# Patient Record
Sex: MALE | Race: WHITE | NOT HISPANIC OR LATINO | Employment: PART TIME
[De-identification: names, ages, dates, MRNs, and addresses within clinical notes are randomized per-mention and may not be internally consistent; named-entity substitution may affect disease eponyms.]

---

## 2024-06-12 ENCOUNTER — DOCUMENTATION (OUTPATIENT)
Dept: DATA CONVERSION | Age: 49
End: 2024-06-12
Payer: COMMERCIAL

## 2024-06-28 ENCOUNTER — LAB (OUTPATIENT)
Dept: LAB | Facility: LAB | Age: 49
End: 2024-06-28
Payer: COMMERCIAL

## 2024-06-28 ENCOUNTER — OFFICE VISIT (OUTPATIENT)
Dept: PAIN MEDICINE | Facility: HOSPITAL | Age: 49
End: 2024-06-28
Payer: COMMERCIAL

## 2024-06-28 VITALS — BODY MASS INDEX: 20.32 KG/M2 | HEIGHT: 72 IN | WEIGHT: 150 LBS

## 2024-06-28 DIAGNOSIS — M51.37 DEGENERATION OF LUMBAR OR LUMBOSACRAL INTERVERTEBRAL DISC: ICD-10-CM

## 2024-06-28 DIAGNOSIS — Z79.899 ENCOUNTER FOR LONG-TERM (CURRENT) DRUG USE: ICD-10-CM

## 2024-06-28 DIAGNOSIS — Z98.890 S/P BILATERAL INGUINAL HERNIORRHAPHY: ICD-10-CM

## 2024-06-28 DIAGNOSIS — R10.2 PELVIC PAIN: Primary | Chronic | ICD-10-CM

## 2024-06-28 DIAGNOSIS — Z87.19 S/P BILATERAL INGUINAL HERNIORRHAPHY: ICD-10-CM

## 2024-06-28 LAB
AMPHETAMINES UR QL SCN: ABNORMAL
BARBITURATES UR QL SCN: ABNORMAL
BENZODIAZ UR QL SCN: ABNORMAL
BZE UR QL SCN: ABNORMAL
CANNABINOIDS UR QL SCN: ABNORMAL
FENTANYL+NORFENTANYL UR QL SCN: ABNORMAL
METHADONE UR QL SCN: ABNORMAL
OPIATES UR QL SCN: ABNORMAL
OXYCODONE+OXYMORPHONE UR QL SCN: ABNORMAL
PCP UR QL SCN: ABNORMAL

## 2024-06-28 PROCEDURE — 80307 DRUG TEST PRSMV CHEM ANLYZR: CPT

## 2024-06-28 PROCEDURE — 99203 OFFICE O/P NEW LOW 30 MIN: CPT | Performed by: PAIN MEDICINE

## 2024-06-28 PROCEDURE — 1036F TOBACCO NON-USER: CPT | Performed by: PAIN MEDICINE

## 2024-06-28 PROCEDURE — 80361 OPIATES 1 OR MORE: CPT

## 2024-06-28 PROCEDURE — 80346 BENZODIAZEPINES1-12: CPT

## 2024-06-28 PROCEDURE — 80349 CANNABINOIDS NATURAL: CPT

## 2024-06-28 PROCEDURE — 99213 OFFICE O/P EST LOW 20 MIN: CPT | Performed by: PAIN MEDICINE

## 2024-06-28 PROCEDURE — 80365 DRUG SCREENING OXYCODONE: CPT

## 2024-06-28 RX ORDER — HYDROCODONE BITARTRATE AND ACETAMINOPHEN 5; 325 MG/1; MG/1
1 TABLET ORAL 3 TIMES DAILY PRN
COMMUNITY

## 2024-06-28 RX ORDER — DICYCLOMINE HYDROCHLORIDE 10 MG/1
10 CAPSULE ORAL 3 TIMES DAILY PRN
COMMUNITY

## 2024-06-28 RX ORDER — MELOXICAM 7.5 MG/1
7.5 TABLET ORAL DAILY PRN
COMMUNITY

## 2024-06-28 RX ORDER — ALPRAZOLAM 1 MG/1
1 TABLET ORAL 3 TIMES DAILY PRN
COMMUNITY

## 2024-06-28 RX ORDER — DIPHENOXYLATE HYDROCHLORIDE AND ATROPINE SULFATE 2.5; .025 MG/5ML; MG/5ML
5 SOLUTION ORAL 3 TIMES DAILY PRN
COMMUNITY

## 2024-06-28 RX ORDER — NALOXONE HYDROCHLORIDE 4 MG/.1ML
1 SPRAY NASAL AS NEEDED
Qty: 2 EACH | Refills: 0 | Status: SHIPPED | OUTPATIENT
Start: 2024-06-28

## 2024-06-28 RX ORDER — METHOCARBAMOL 750 MG/1
750 TABLET, FILM COATED ORAL NIGHTLY
COMMUNITY

## 2024-06-28 RX ORDER — HYDROCODONE BITARTRATE AND ACETAMINOPHEN 5; 325 MG/1; MG/1
1 TABLET ORAL EVERY 8 HOURS PRN
Qty: 90 TABLET | Refills: 0 | Status: SHIPPED | OUTPATIENT
Start: 2024-06-28 | End: 2024-07-28

## 2024-06-28 RX ORDER — ESTAZOLAM 2 MG/1
2 TABLET ORAL NIGHTLY
COMMUNITY

## 2024-06-28 ASSESSMENT — ENCOUNTER SYMPTOMS
ABDOMINAL PAIN: 1
BACK PAIN: 1
PSYCHIATRIC NEGATIVE: 1
RESPIRATORY NEGATIVE: 1
CONSTITUTIONAL NEGATIVE: 1
CARDIOVASCULAR NEGATIVE: 1
NEUROLOGICAL NEGATIVE: 1
HEMATOLOGIC/LYMPHATIC NEGATIVE: 1
EYES NEGATIVE: 1
ENDOCRINE NEGATIVE: 1
ALLERGIC/IMMUNOLOGIC NEGATIVE: 1

## 2024-06-28 ASSESSMENT — PAIN - FUNCTIONAL ASSESSMENT: PAIN_FUNCTIONAL_ASSESSMENT: 0-10

## 2024-06-28 ASSESSMENT — PAIN SCALES - GENERAL
PAINLEVEL_OUTOF10: 7
PAINLEVEL: 7

## 2024-06-28 NOTE — PROGRESS NOTES
Subjective   Anthony Faye is a 49 y.o. male with a history of anxiety, depression presents for evaluation of his low back, pelvic and hip pain.  Patient has long history of chronic low back pain, worse on the right with right-sided radicular symptoms in the L5-S1 distribution. It is attributed to his degenerative disc disease most prominent in the L5/S1 region.  He also has chronic history of pelvic pain which she reports started after his multiple hernia surgeries. It was exacerbated after last surgery in 2016.  He describes his pain as soreness, 7/10 intensity on most days.  He has previously been stable on hydrocodone 5-325 every 8 hours as needed.    Past medical history   -Anxiety     Past Surgical history:   -Lipoma removal   -Hernia repair x4     I have reviewed the nurses notes and am aware of family/social history.     Review of Systems   Constitutional: Negative.    HENT: Negative.     Eyes: Negative.    Respiratory: Negative.     Cardiovascular: Negative.    Gastrointestinal:  Positive for abdominal pain.   Endocrine: Negative.    Genitourinary: Negative.    Musculoskeletal:  Positive for back pain.   Skin: Negative.    Allergic/Immunologic: Negative.    Neurological: Negative.    Hematological: Negative.    Psychiatric/Behavioral: Negative.         Objective   Physical Exam  Eyes:      Extraocular Movements: Extraocular movements intact.      Pupils: Pupils are equal, round, and reactive to light.   Cardiovascular:      Rate and Rhythm: Normal rate and regular rhythm.   Pulmonary:      Effort: Pulmonary effort is normal.      Breath sounds: Normal breath sounds.   Abdominal:      General: Abdomen is flat.      Palpations: Abdomen is soft.   Musculoskeletal:         General: Normal range of motion.   Skin:     General: Skin is warm.   Neurological:      General: No focal deficit present.      Mental Status: He is alert and oriented to person, place, and time. Mental status is at baseline.      Comments:  Motor 5/5 bilateral upper lower extremity.  Sensory 5/5 bilateral upper lower extremity.     Provocative test:  Bilateral straight leg raise test negative.  Bilateral BHARAT test negative.    ASSESSMENT:     49-year-old male patient presenting for management of his chronic back, pelvic pain.  His back pain is due to degenerative disc disease that is most prominent at L5-S1.  It is worse on the right side with associated right-sided radiculopathy in the L5-S1 distribution.  His chronic pelvic pain is due to his pain after his multiple hernia repairs, that was exacerbated significantly after his last surgery in 2016.  He has been chronically treated with hydrocodone 5-325 mg every 8 hours.    PLAN:   -Continue hydrocodone 5-325 mg every 8 hours as needed    Discussed treatment plan with patient.   Call or return to clinic prn if these symptoms worsen or fail to improve as anticipated.     Radha Roberts MD

## 2024-07-01 DIAGNOSIS — M54.16 LUMBAR RADICULOPATHY: ICD-10-CM

## 2024-07-01 DIAGNOSIS — M96.1 POSTLAMINECTOMY SYNDROME OF LUMBAR REGION: Primary | ICD-10-CM

## 2024-07-02 LAB — CARBOXYTHC UR-MCNC: >500 NG/ML

## 2024-07-02 RX ORDER — METHOCARBAMOL 750 MG/1
750 TABLET, FILM COATED ORAL NIGHTLY
Qty: 30 TABLET | Refills: 3 | Status: SHIPPED | OUTPATIENT
Start: 2024-07-02 | End: 2024-10-30

## 2024-07-02 RX ORDER — MELOXICAM 7.5 MG/1
7.5 TABLET ORAL DAILY PRN
Qty: 30 TABLET | Refills: 0 | Status: SHIPPED | OUTPATIENT
Start: 2024-07-02 | End: 2024-08-01

## 2024-07-03 LAB
1OH-MIDAZOLAM UR CFM-MCNC: <25 NG/ML
6MAM UR CFM-MCNC: <25 NG/ML
7AMINOCLONAZEPAM UR CFM-MCNC: <25 NG/ML
A-OH ALPRAZ UR CFM-MCNC: 495 NG/ML
ALPRAZ UR CFM-MCNC: 353 NG/ML
CHLORDIAZEP UR CFM-MCNC: <25 NG/ML
CLONAZEPAM UR CFM-MCNC: <25 NG/ML
CODEINE UR CFM-MCNC: <50 NG/ML
DIAZEPAM UR CFM-MCNC: <25 NG/ML
HYDROCODONE CTO UR CFM-MCNC: 353 NG/ML
HYDROMORPHONE UR CFM-MCNC: 113 NG/ML
LORAZEPAM UR CFM-MCNC: <25 NG/ML
MIDAZOLAM UR CFM-MCNC: <25 NG/ML
MORPHINE UR CFM-MCNC: <50 NG/ML
NORDIAZEPAM UR CFM-MCNC: <25 NG/ML
NORHYDROCODONE UR CFM-MCNC: 634 NG/ML
NOROXYCODONE UR CFM-MCNC: <25 NG/ML
OXAZEPAM UR CFM-MCNC: <25 NG/ML
OXYCODONE UR CFM-MCNC: <25 NG/ML
OXYMORPHONE UR CFM-MCNC: <25 NG/ML
TEMAZEPAM UR CFM-MCNC: <25 NG/ML

## 2024-07-31 DIAGNOSIS — M51.37 DEGENERATION OF LUMBAR OR LUMBOSACRAL INTERVERTEBRAL DISC: ICD-10-CM

## 2024-07-31 DIAGNOSIS — Z98.890 S/P BILATERAL INGUINAL HERNIORRHAPHY: ICD-10-CM

## 2024-07-31 DIAGNOSIS — Z87.19 S/P BILATERAL INGUINAL HERNIORRHAPHY: ICD-10-CM

## 2024-07-31 DIAGNOSIS — M54.16 LUMBAR RADICULOPATHY: ICD-10-CM

## 2024-07-31 DIAGNOSIS — M96.1 POSTLAMINECTOMY SYNDROME OF LUMBAR REGION: ICD-10-CM

## 2024-07-31 DIAGNOSIS — R10.2 PELVIC PAIN: ICD-10-CM

## 2024-07-31 RX ORDER — MELOXICAM 7.5 MG/1
TABLET ORAL
Qty: 30 TABLET | Refills: 0 | Status: SHIPPED | OUTPATIENT
Start: 2024-07-31

## 2024-07-31 RX ORDER — HYDROCODONE BITARTRATE AND ACETAMINOPHEN 5; 325 MG/1; MG/1
1 TABLET ORAL EVERY 8 HOURS PRN
Qty: 90 TABLET | Refills: 0 | Status: SHIPPED | OUTPATIENT
Start: 2024-07-31 | End: 2024-08-30

## 2024-07-31 RX ORDER — HYDROCODONE BITARTRATE AND ACETAMINOPHEN 5; 325 MG/1; MG/1
1 TABLET ORAL 3 TIMES DAILY PRN
Qty: 90 TABLET | Refills: 0 | Status: CANCELLED | OUTPATIENT
Start: 2024-07-31 | End: 2024-08-30

## 2024-08-26 ENCOUNTER — OFFICE VISIT (OUTPATIENT)
Dept: PAIN MEDICINE | Facility: HOSPITAL | Age: 49
End: 2024-08-26
Payer: COMMERCIAL

## 2024-08-26 DIAGNOSIS — M51.37 DEGENERATION OF LUMBAR OR LUMBOSACRAL INTERVERTEBRAL DISC: ICD-10-CM

## 2024-08-26 DIAGNOSIS — Z98.890 S/P BILATERAL INGUINAL HERNIORRHAPHY: ICD-10-CM

## 2024-08-26 DIAGNOSIS — Z87.19 S/P BILATERAL INGUINAL HERNIORRHAPHY: ICD-10-CM

## 2024-08-26 DIAGNOSIS — M54.16 LUMBAR RADICULOPATHY: ICD-10-CM

## 2024-08-26 PROCEDURE — 99213 OFFICE O/P EST LOW 20 MIN: CPT | Performed by: PAIN MEDICINE

## 2024-08-26 RX ORDER — HYDROCODONE BITARTRATE AND ACETAMINOPHEN 5; 325 MG/1; MG/1
2.5 TABLET ORAL EVERY 6 HOURS PRN
Qty: 75 TABLET | Refills: 1 | Status: CANCELLED | OUTPATIENT
Start: 2024-08-26 | End: 2024-09-25

## 2024-08-26 RX ORDER — METHOCARBAMOL 750 MG/1
750 TABLET, FILM COATED ORAL NIGHTLY
Qty: 90 TABLET | Refills: 3 | Status: SHIPPED | OUTPATIENT
Start: 2024-08-26 | End: 2025-08-21

## 2024-08-26 ASSESSMENT — PAIN SCALES - GENERAL: PAINLEVEL: 7

## 2024-08-26 NOTE — PROGRESS NOTES
Subjective   Patient ID: Anthony Faye is a 49 y.o. male with a past medical history of anxiety, depression, low back, pelvic, and hip pain.     HPI:   Long standing low back pain, worse on R with occasional (once per month) R sided radicular symptoms in L5-S1 distribution. Also has chronic pelvic pain from multiple hernia surgeries. Has good control of urinary and bowel function. No falls or weakness. Pain is typically a soreness. Has been on hydrocodone 5-325 every 8 hours as needed. Usually takes 2-3 per day. Patient recently tested positive for THC.          Last Urine Drug Screen:  Recent Results (from the past 8760 hour(s))   Opiate Confirmation, Urine    Collection Time: 06/28/24 12:25 PM   Result Value Ref Range    6-Acetylmorphine <25 <25 ng/mL    Codeine <50 <50 ng/mL    Hydrocodone 353 (H) <25 ng/mL    Hydromorphone 113 (H) <25 ng/mL    Morphine  <50 <50 ng/mL    Norhydrocodone 634 (H) <25 ng/mL    Noroxycodone <25 <25 ng/mL    Oxycodone <25 <25 ng/mL    Oxymorphone <25 <25 ng/mL   Benzodiazepine Confirmation, Urine    Collection Time: 06/28/24 12:25 PM   Result Value Ref Range    Clonazepam <25 <25 ng/mL    7-Aminoclonazepam <25 <25 ng/mL    Alprazolam 353 (H) <25 ng/mL    Alpha-Hydroxyalprazolam 495 (H) <25 ng/mL    Midazolam <25 <25 ng/mL    Alpha-Hydroxymidazolam <25 <25 ng/mL    Chlordiazepoxide <25 <25 ng/mL    Diazepam <25 <25 ng/mL    Nordiazepam <25 <25 ng/mL    Temazepam <25 <25 ng/mL    Oxazepam <25 <25 ng/mL    Lorazepam <25 <25 ng/mL   Drug Screen, Urine With Reflex to Confirmation    Collection Time: 06/28/24 12:25 PM   Result Value Ref Range    Amphetamine Screen, Urine Presumptive Negative Presumptive Negative    Barbiturate Screen, Urine Presumptive Negative Presumptive Negative    Benzodiazepines Screen, Urine Presumptive Positive (A) Presumptive Negative    Cannabinoid Screen, Urine Presumptive Positive (A) Presumptive Negative    Cocaine Metabolite Screen, Urine Presumptive Negative  Presumptive Negative    Fentanyl Screen, Urine Presumptive Negative Presumptive Negative    Opiate Screen, Urine Presumptive Positive (A) Presumptive Negative    Oxycodone Screen, Urine Presumptive Negative Presumptive Negative    PCP Screen, Urine Presumptive Negative Presumptive Negative    Methadone Screen, Urine Presumptive Negative Presumptive Negative     Results unexpected. THC      Review of Systems   13-point ROS done and negative except for HPI.     Current Outpatient Medications   Medication Instructions    ALPRAZolam (XANAX) 1 mg, oral, 3 times daily PRN    dicyclomine (BENTYL) 10 mg, oral, 3 times daily PRN    diphenoxylate-atropine (Lomotil) 2.5-0.025 mg/5 mL liquid 5 mL, oral, 3 times daily PRN    estazolam (PROSOM) 2 mg, oral, Nightly    HYDROcodone-acetaminophen (Norco) 5-325 mg tablet 1 tablet, oral, 3 times daily PRN    HYDROcodone-acetaminophen (Norco) 5-325 mg tablet 1 tablet, oral, Every 8 hours PRN    meloxicam (Mobic) 7.5 mg tablet TAKE 1 TABLET BY MOUTH ONCE DAILY AS NEEDED FOR MODERATE PAIN (4 TO 6 ON PAIN SCALE)    methocarbamol (ROBAXIN) 750 mg, oral, Nightly    naloxone (NARCAN) 4 mg, nasal, As needed, May repeat every 2-3 minutes if needed, alternating nostrils, until medical assistance becomes available.       No past medical history on file.     No past surgical history on file.     No family history on file.     Allergies   Allergen Reactions    Augmentin [Amoxicillin-Pot Clavulanate] GI Upset    Furazolidone Unknown    Nortriptyline Unknown     Dilated pupil, insomnia, red skin blotches       Penicillins Diarrhea    Sertraline Unknown        Objective     There were no vitals filed for this visit.     Physical Exam  General: NAD, well groomed, well nourished  Eyes: Non-icteric sclera, EOMI  Ears, Nose, Mouth, and Throat: External ears and nose appear to be without deformity or rash. No lesions or masses noted. Hearing is grossly intact.   Neck: Trachea midline  Respiratory:  Nonlabored breathing   Cardiovascular: no peripheral edema   Skin: No rashes or open lesions/ulcers identified on skin.    Back:   Palpation: No tenderness to palpation over lumbar paraspinous muscles.   Straight leg raise: positive at 30 degrees bilaterally    Neurologic:   Cranial nerves grossly intact.   Strength 5/5 and symmetric plantar/dorsiflexion     Psychiatric: Alert, orientation to person, place, and time. Cooperative.      Assessment/Plan   Anthony Faye is a 49 y.o. male with a past medical history of anxiety, depression, low back, pelvic, and hip pain. He has been taking hydrocodone 5-325 every 8 hours as needed with decent control of his symptoms. He has been weaned down to his current dose. Discussed with patient that opioids cannot be combined with THC and the need to make long term plan to continue to wean him down from his opioids. Patient using 2-3 pills per day.      Plan:  -continue hydrocodone 5-325 but with 75 pills per month (down from 90)    The patient was invited to contact us back anytime with any questions or concerns and follow-up with us in the office as needed.

## 2024-08-28 DIAGNOSIS — M96.1 POSTLAMINECTOMY SYNDROME OF LUMBAR REGION: ICD-10-CM

## 2024-08-28 DIAGNOSIS — M54.16 LUMBAR RADICULOPATHY: ICD-10-CM

## 2024-08-28 DIAGNOSIS — Z87.19 S/P BILATERAL INGUINAL HERNIORRHAPHY: ICD-10-CM

## 2024-08-28 DIAGNOSIS — Z98.890 S/P BILATERAL INGUINAL HERNIORRHAPHY: ICD-10-CM

## 2024-08-28 DIAGNOSIS — M51.37 DEGENERATION OF LUMBAR OR LUMBOSACRAL INTERVERTEBRAL DISC: ICD-10-CM

## 2024-08-28 RX ORDER — HYDROCODONE BITARTRATE AND ACETAMINOPHEN 5; 325 MG/1; MG/1
1 TABLET ORAL EVERY 8 HOURS PRN
Qty: 75 TABLET | Refills: 0 | Status: SHIPPED | OUTPATIENT
Start: 2024-08-28 | End: 2024-09-27

## 2024-09-26 DIAGNOSIS — Z98.890 S/P BILATERAL INGUINAL HERNIORRHAPHY: ICD-10-CM

## 2024-09-26 DIAGNOSIS — M51.37 DEGENERATION OF LUMBAR OR LUMBOSACRAL INTERVERTEBRAL DISC: ICD-10-CM

## 2024-09-26 DIAGNOSIS — Z87.19 S/P BILATERAL INGUINAL HERNIORRHAPHY: ICD-10-CM

## 2024-09-27 ENCOUNTER — TELEPHONE (OUTPATIENT)
Dept: PAIN MEDICINE | Facility: HOSPITAL | Age: 49
End: 2024-09-27
Payer: COMMERCIAL

## 2024-09-27 DIAGNOSIS — M54.16 LUMBAR RADICULOPATHY: ICD-10-CM

## 2024-09-27 DIAGNOSIS — M96.1 POSTLAMINECTOMY SYNDROME OF LUMBAR REGION: ICD-10-CM

## 2024-09-27 RX ORDER — HYDROCODONE BITARTRATE AND ACETAMINOPHEN 5; 325 MG/1; MG/1
1 TABLET ORAL EVERY 8 HOURS PRN
Qty: 75 TABLET | Refills: 0 | OUTPATIENT
Start: 2024-09-27 | End: 2024-10-27

## 2024-09-27 RX ORDER — MELOXICAM 7.5 MG/1
TABLET ORAL
Qty: 30 TABLET | Refills: 0 | Status: SHIPPED | OUTPATIENT
Start: 2024-09-27

## 2024-09-27 RX ORDER — HYDROCODONE BITARTRATE AND ACETAMINOPHEN 5; 325 MG/1; MG/1
1 TABLET ORAL 2 TIMES DAILY PRN
Qty: 60 TABLET | Refills: 0 | Status: SHIPPED | OUTPATIENT
Start: 2024-09-27 | End: 2024-10-27

## 2024-09-27 NOTE — TELEPHONE ENCOUNTER
Mr. Faye is upset about the decrease down to #60 tabs. He said he cannot continue to wean down. He would like to stay on the #75 tabs.     If you plan to continue weaning him down more, he will change insurances and go back to his previous doctor in [place] who was giving him Ono.

## 2024-09-30 NOTE — TELEPHONE ENCOUNTER
I spoke to Mr. Faye. He is asking if you would like an updated MRI.     Elsa Pepe suggested last time he was at [facility] that updated imaging might be helpful...

## 2024-10-04 DIAGNOSIS — M54.16 LUMBAR RADICULOPATHY: ICD-10-CM

## 2024-10-15 ENCOUNTER — HOSPITAL ENCOUNTER (EMERGENCY)
Facility: HOSPITAL | Age: 49
Discharge: HOME | End: 2024-10-15
Attending: STUDENT IN AN ORGANIZED HEALTH CARE EDUCATION/TRAINING PROGRAM
Payer: COMMERCIAL

## 2024-10-15 VITALS
SYSTOLIC BLOOD PRESSURE: 155 MMHG | WEIGHT: 155 LBS | TEMPERATURE: 97.7 F | OXYGEN SATURATION: 98 % | HEIGHT: 72 IN | DIASTOLIC BLOOD PRESSURE: 74 MMHG | HEART RATE: 78 BPM | BODY MASS INDEX: 20.99 KG/M2 | RESPIRATION RATE: 18 BRPM

## 2024-10-15 DIAGNOSIS — K02.9 PAIN DUE TO DENTAL CARIES: Primary | ICD-10-CM

## 2024-10-15 PROCEDURE — 64400 NJX AA&/STRD TRIGEMINAL NRV: CPT | Performed by: STUDENT IN AN ORGANIZED HEALTH CARE EDUCATION/TRAINING PROGRAM

## 2024-10-15 PROCEDURE — 2500000001 HC RX 250 WO HCPCS SELF ADMINISTERED DRUGS (ALT 637 FOR MEDICARE OP): Performed by: STUDENT IN AN ORGANIZED HEALTH CARE EDUCATION/TRAINING PROGRAM

## 2024-10-15 PROCEDURE — 99284 EMERGENCY DEPT VISIT MOD MDM: CPT | Mod: 25

## 2024-10-15 PROCEDURE — 2500000004 HC RX 250 GENERAL PHARMACY W/ HCPCS (ALT 636 FOR OP/ED): Performed by: STUDENT IN AN ORGANIZED HEALTH CARE EDUCATION/TRAINING PROGRAM

## 2024-10-15 RX ORDER — OXYCODONE HYDROCHLORIDE 5 MG/1
10 TABLET ORAL ONCE
Status: COMPLETED | OUTPATIENT
Start: 2024-10-15 | End: 2024-10-15

## 2024-10-15 RX ORDER — ACETAMINOPHEN 500 MG
1000 TABLET ORAL EVERY 8 HOURS PRN
Qty: 30 TABLET | Refills: 0 | Status: SHIPPED | OUTPATIENT
Start: 2024-10-15 | End: 2024-10-20

## 2024-10-15 RX ORDER — IBUPROFEN 800 MG/1
800 TABLET ORAL 3 TIMES DAILY
Qty: 21 TABLET | Refills: 0 | Status: SHIPPED | OUTPATIENT
Start: 2024-10-15 | End: 2024-10-22

## 2024-10-15 RX ORDER — BUPIVACAINE HYDROCHLORIDE 5 MG/ML
3 INJECTION, SOLUTION EPIDURAL; INTRACAUDAL ONCE
Status: COMPLETED | OUTPATIENT
Start: 2024-10-15 | End: 2024-10-15

## 2024-10-15 RX ORDER — LIDOCAINE HYDROCHLORIDE AND EPINEPHRINE 10; 10 MG/ML; UG/ML
3 INJECTION, SOLUTION INFILTRATION; PERINEURAL ONCE
Status: COMPLETED | OUTPATIENT
Start: 2024-10-15 | End: 2024-10-15

## 2024-10-15 RX ORDER — ACETAMINOPHEN 325 MG/1
975 TABLET ORAL ONCE
Status: COMPLETED | OUTPATIENT
Start: 2024-10-15 | End: 2024-10-15

## 2024-10-15 RX ORDER — CEPHALEXIN 250 MG/1
500 CAPSULE ORAL ONCE
Status: COMPLETED | OUTPATIENT
Start: 2024-10-15 | End: 2024-10-15

## 2024-10-15 RX ORDER — CEPHALEXIN 500 MG/1
500 CAPSULE ORAL 4 TIMES DAILY
Qty: 40 CAPSULE | Refills: 0 | Status: SHIPPED | OUTPATIENT
Start: 2024-10-15 | End: 2024-10-25

## 2024-10-15 ASSESSMENT — COLUMBIA-SUICIDE SEVERITY RATING SCALE - C-SSRS
2. HAVE YOU ACTUALLY HAD ANY THOUGHTS OF KILLING YOURSELF?: NO
1. IN THE PAST MONTH, HAVE YOU WISHED YOU WERE DEAD OR WISHED YOU COULD GO TO SLEEP AND NOT WAKE UP?: NO
6. HAVE YOU EVER DONE ANYTHING, STARTED TO DO ANYTHING, OR PREPARED TO DO ANYTHING TO END YOUR LIFE?: NO

## 2024-10-15 NOTE — ED PROVIDER NOTES
HPI   Chief Complaint   Patient presents with    Dental Pain     Has been having issues with this tooth for 5 years but worse in the last 3 days. Took 2 vicodin earlier today and another one 2 hrs ago and also took a naproxen       HPI: Patient is a 49-year-old male, he is presenting to the emergency department for dental pain.  Patient has a history of dental caries, he has had a root canal in the past, he is presenting to the emergency department for pain and swelling near the left mandibular molar, first.  He feels like there is an infection.  Increasing pain, increasing hot and cold intolerance with eating, he is concerned he may need the tooth pulled.  No fevers or chills.  No trauma.  No nausea or vomiting.  No recent antibiotics.    ROS: Negative except as noted in HPI    PMH: Reviewed and pertinents are documented in HPI  PSH: Reviewed and pertinents are documented in HPI  Meds: Reviewed  FH: Not relevant to patients chief complaint  Social: No illicits.  Not homeless.  Allergies: Noted, and documented in the EMR if any            History provided by:  Patient   used: No                          Meredith Coma Scale Score: 15                  Patient History   History reviewed. No pertinent past medical history.  History reviewed. No pertinent surgical history.  No family history on file.  Social History     Tobacco Use    Smoking status: Never    Smokeless tobacco: Former   Substance Use Topics    Alcohol use: Not on file    Drug use: Yes     Types: Marijuana       Physical Exam   Visit Vitals  /90   Pulse 70   Temp 36.5 °C (97.7 °F) (Temporal)   Resp 16   Ht 1.829 m (6')   Wt 70.3 kg (155 lb)   SpO2 99%   BMI 21.02 kg/m²   Smoking Status Never   BSA 1.89 m²      Physical Exam  Vitals and nursing note reviewed.   Constitutional:       Appearance: Normal appearance.   HENT:      Head: Normocephalic and atraumatic.      Nose: Nose normal.      Mouth/Throat:      Mouth: Mucous  membranes are moist.      Pharynx: Oropharynx is clear. No oropharyngeal exudate or posterior oropharyngeal erythema.     Eyes:      Extraocular Movements: Extraocular movements intact.      Conjunctiva/sclera: Conjunctivae normal.      Pupils: Pupils are equal, round, and reactive to light.   Neck:      Vascular: No carotid bruit.   Cardiovascular:      Rate and Rhythm: Normal rate and regular rhythm.      Pulses: Normal pulses.      Heart sounds: Normal heart sounds.   Pulmonary:      Effort: Pulmonary effort is normal.      Breath sounds: Normal breath sounds.   Musculoskeletal:      Cervical back: Normal range of motion. No rigidity.   Neurological:      Mental Status: He is alert.         No orders to display       Labs Reviewed - No data to display      ED Course & MDM   Diagnoses as of 10/15/24 0210   Pain due to dental caries           Medical Decision Making  All mentioned lab results, ECGs, and imaging were independently reviewed by myself  - Patient evaluated. Patient presenting to the emergency department for dental pain.  Based on my history and examination it appears that the patient has dental caries that are contributing to a likely tooth infection.  He was started on antibiotics.  I discussed the risks and benefits of pursuing a dental block with the patient.  He is agreeable.  Dental block was performed, see procedure note for full details.  Patient only had a mild improvement of his pain and so additional analgesia orally was provided.  Prescriptions were sent to the patient's pharmacy, dental resources were given and discussed with the patient as well.  Patient was discharged otherwise stable condition with dental follow-up.    - Monitored for any changes in stability or symptomatology. Patient remained stable.   - Counseled regarding labs, imaging, diagnosis, and plan. Patient was agreeable. All questions were answered. The patient was receptive and agreeable to the plan of care.   -The patient  was instructed to return to the emergency department if any symptoms recurred, worsened, or if there were any additional concerns.    *Disclaimer: This note was dictated by speech recognition. Minor errors in transcription may be present. Please call with questions.    Sahil Greenberg MD             Your medication list        START taking these medications        Instructions Last Dose Given Next Dose Due   acetaminophen 500 mg tablet  Commonly known as: Tylenol Extra Strength      Take 2 tablets (1,000 mg) by mouth every 8 hours if needed for mild pain (1 - 3) or moderate pain (4 - 6) for up to 5 days.       cephalexin 500 mg capsule  Commonly known as: Keflex      Take 1 capsule (500 mg) by mouth 4 times a day for 10 days.       ibuprofen 800 mg tablet      Take 1 tablet (800 mg) by mouth 3 times a day for 7 days.              ASK your doctor about these medications        Instructions Last Dose Given Next Dose Due   ALPRAZolam 1 mg tablet  Commonly known as: Xanax           dicyclomine 10 mg capsule  Commonly known as: Bentyl           diphenoxylate-atropine 2.5-0.025 mg/5 mL liquid  Commonly known as: Lomotil           estazolam 2 mg tablet  Commonly known as: Prosom           HYDROcodone-acetaminophen 5-325 mg tablet  Commonly known as: Norco      Take 1 tablet by mouth 2 times a day as needed for severe pain (7 - 10).       meloxicam 7.5 mg tablet  Commonly known as: Mobic      TAKE 1 TABLET BY MOUTH ONCE DAILY AS NEEDED FOR MODERATE PAIN (4 TO 6 ON PAIN SCALE).       methocarbamol 750 mg tablet  Commonly known as: Robaxin      Take 1 tablet (750 mg) by mouth once daily at bedtime.       naloxone 4 mg/0.1 mL nasal spray  Commonly known as: Narcan      Administer 1 spray (4 mg) into affected nostril(s) if needed for opioid reversal. May repeat every 2-3 minutes if needed, alternating nostrils, until medical assistance becomes available.                 Where to Get Your Medications        These medications  were sent to [id number] - [place], [place] - [address]  [address], [place] [address]      Phone: [phone]   acetaminophen 500 mg tablet  cephalexin 500 mg capsule  ibuprofen 800 mg tablet         Procedure  Dental Block    Performed by: Shahbaz Greenberg MD  Authorized by: Shahbaz Greenberg MD    Consent:     Consent obtained:  Verbal    Consent given by:  Patient    Risks, benefits, and alternatives were discussed: yes      Risks discussed:  Infection, swelling, pain, unsuccessful block and intravascular injection    Alternatives discussed:  Alternative treatment  Universal protocol:     Patient identity confirmed:  Verbally with patient  Indications:     Indications: dental pain    Location:     Block type:  Inferior alveolar    Laterality:  Left  Procedure details:     Syringe type:  Aspirating dental syringe    Needle gauge:  25 G    Anesthetic injected:  Lidocaine 1% WITH epi and bupivacaine 0.5% w/o epi    Injection procedure:  Anatomic landmarks identified, anatomic landmarks palpated, introduced needle, negative aspiration for blood and incremental injection  Post-procedure details:     Outcome: only mild improvement.    Procedure completion:  Tolerated well, no immediate complications       *This report was transcribed using voice recognition software.  Every effort was made to ensure accuracy; however, inadvertent computerized transcription errors may be present.*  Shahbaz Greenberg MD  10/15/24         Shahbaz Greenberg MD  10/15/24 [id number]

## 2024-10-28 DIAGNOSIS — M96.1 POSTLAMINECTOMY SYNDROME OF LUMBAR REGION: ICD-10-CM

## 2024-10-28 DIAGNOSIS — M54.16 LUMBAR RADICULOPATHY: ICD-10-CM

## 2024-10-28 RX ORDER — HYDROCODONE BITARTRATE AND ACETAMINOPHEN 5; 325 MG/1; MG/1
1 TABLET ORAL 2 TIMES DAILY PRN
Qty: 60 TABLET | Refills: 0 | Status: SHIPPED | OUTPATIENT
Start: 2024-10-28 | End: 2024-11-27

## 2024-11-08 ENCOUNTER — TELEPHONE (OUTPATIENT)
Dept: PAIN MEDICINE | Facility: CLINIC | Age: 49
End: 2024-11-08
Payer: COMMERCIAL

## 2024-11-08 NOTE — TELEPHONE ENCOUNTER
Mr. Faye called -   He is not taking his Norco as prescribed. It is written #60 tabs per month. He is asking to go back to tid / #75 tabs per month.  His last Rx was 10/28/24

## 2024-11-25 ENCOUNTER — OFFICE VISIT (OUTPATIENT)
Dept: PAIN MEDICINE | Facility: HOSPITAL | Age: 49
End: 2024-11-25
Payer: COMMERCIAL

## 2024-11-25 DIAGNOSIS — Z87.19 S/P BILATERAL INGUINAL HERNIORRHAPHY: ICD-10-CM

## 2024-11-25 DIAGNOSIS — Z98.890 S/P BILATERAL INGUINAL HERNIORRHAPHY: ICD-10-CM

## 2024-11-25 DIAGNOSIS — Z79.899 ENCOUNTER FOR LONG-TERM (CURRENT) DRUG USE: ICD-10-CM

## 2024-11-25 DIAGNOSIS — M54.16 LUMBAR RADICULOPATHY: Primary | ICD-10-CM

## 2024-11-25 PROCEDURE — 99214 OFFICE O/P EST MOD 30 MIN: CPT | Performed by: PAIN MEDICINE

## 2024-11-25 RX ORDER — HYDROCODONE BITARTRATE AND ACETAMINOPHEN 5; 325 MG/1; MG/1
1 TABLET ORAL 3 TIMES DAILY PRN
Qty: 75 TABLET | Refills: 0 | Status: SHIPPED | OUTPATIENT
Start: 2024-11-25 | End: 2024-12-25

## 2024-11-25 RX ORDER — MELOXICAM 7.5 MG/1
7.5 TABLET ORAL DAILY
Qty: 30 TABLET | Refills: 3 | Status: SHIPPED | OUTPATIENT
Start: 2024-11-25 | End: 2025-03-25

## 2024-11-25 RX ORDER — METHOCARBAMOL 750 MG/1
750 TABLET, FILM COATED ORAL NIGHTLY
Qty: 90 TABLET | Refills: 3 | Status: SHIPPED | OUTPATIENT
Start: 2024-11-25 | End: 2025-11-20

## 2024-11-25 NOTE — PROGRESS NOTES
Subjective   Patient ID: Anthony Faye is a 49 y.o. male with a past medical history of anxiety, depression, low back, pelvic, and hip pain.      HPI:   Patient is coming back for medication refill. He continues to have 7/10 pain in his pelvis and lumbar region. The norco works well with his pain and helps him function during his day. Pain is a progressive soreness.     Physical Therapy: The patient completed more than six weeks of formal physical therapy more than six months ago, but has done physician-directed exercises at home every day for greater than six weeks with minimal improvement  Other Conservative Measures he has tried: Ice  Classes of medications tried in the past: Acetaminophen, NSAIDs, Muscle Relaxants, and Opioids    Last Urine Drug Screen:  Recent Results (from the past 8760 hours)   Opiate Confirmation, Urine    Collection Time: 06/28/24 12:25 PM   Result Value Ref Range    6-Acetylmorphine <25 <25 ng/mL    Codeine <50 <50 ng/mL    Hydrocodone 353 (H) <25 ng/mL    Hydromorphone 113 (H) <25 ng/mL    Morphine  <50 <50 ng/mL    Norhydrocodone 634 (H) <25 ng/mL    Noroxycodone <25 <25 ng/mL    Oxycodone <25 <25 ng/mL    Oxymorphone <25 <25 ng/mL   Benzodiazepine Confirmation, Urine    Collection Time: 06/28/24 12:25 PM   Result Value Ref Range    Clonazepam <25 <25 ng/mL    7-Aminoclonazepam <25 <25 ng/mL    Alprazolam 353 (H) <25 ng/mL    Alpha-Hydroxyalprazolam 495 (H) <25 ng/mL    Midazolam <25 <25 ng/mL    Alpha-Hydroxymidazolam <25 <25 ng/mL    Chlordiazepoxide <25 <25 ng/mL    Diazepam <25 <25 ng/mL    Nordiazepam <25 <25 ng/mL    Temazepam <25 <25 ng/mL    Oxazepam <25 <25 ng/mL    Lorazepam <25 <25 ng/mL   Drug Screen, Urine With Reflex to Confirmation    Collection Time: 06/28/24 12:25 PM   Result Value Ref Range    Amphetamine Screen, Urine Presumptive Negative Presumptive Negative    Barbiturate Screen, Urine Presumptive Negative Presumptive Negative    Benzodiazepines Screen, Urine  Presumptive Positive (A) Presumptive Negative    Cannabinoid Screen, Urine Presumptive Positive (A) Presumptive Negative    Cocaine Metabolite Screen, Urine Presumptive Negative Presumptive Negative    Fentanyl Screen, Urine Presumptive Negative Presumptive Negative    Opiate Screen, Urine Presumptive Positive (A) Presumptive Negative    Oxycodone Screen, Urine Presumptive Negative Presumptive Negative    PCP Screen, Urine Presumptive Negative Presumptive Negative    Methadone Screen, Urine Presumptive Negative Presumptive Negative     Results are as expected.       Review of Systems   13-point ROS done and negative except for HPI.     Current Outpatient Medications   Medication Instructions    ALPRAZolam (XANAX) 1 mg, oral, 3 times daily PRN    dicyclomine (BENTYL) 10 mg, oral, 3 times daily PRN    diphenoxylate-atropine (Lomotil) 2.5-0.025 mg/5 mL liquid 5 mL, oral, 3 times daily PRN    estazolam (PROSOM) 2 mg, oral, Nightly    HYDROcodone-acetaminophen (Norco) 5-325 mg tablet 1 tablet, oral, 2 times daily PRN    meloxicam (Mobic) 7.5 mg tablet TAKE 1 TABLET BY MOUTH ONCE DAILY AS NEEDED FOR MODERATE PAIN (4 TO 6 ON PAIN SCALE).    methocarbamol (ROBAXIN) 750 mg, oral, Nightly    naloxone (NARCAN) 4 mg, nasal, As needed, May repeat every 2-3 minutes if needed, alternating nostrils, until medical assistance becomes available.       No past medical history on file.     No past surgical history on file.     No family history on file.     Allergies   Allergen Reactions    Augmentin [Amoxicillin-Pot Clavulanate] GI Upset    Furazolidone Unknown    Nortriptyline Unknown     Dilated pupil, insomnia, red skin blotches       Penicillins Diarrhea    Sertraline Unknown        Objective     There were no vitals filed for this visit.     Physical Exam  General: NAD, well groomed, well nourished  Eyes: Non-icteric sclera, EOMI  Ears, Nose, Mouth, and Throat: External ears and nose appear to be without deformity or rash. No  lesions or masses noted. Hearing is grossly intact.   Neck: Trachea midline  Respiratory: Nonlabored breathing   Cardiovascular: no peripheral edema   Skin: No rashes or open lesions/ulcers identified on skin.    Back:   Palpation: tenderness to palpation over lumbar paraspinous muscles and generalized pain across lumbar spine  BHARAT Maneuver does not reproduce pain bilaterally    Neurologic:   Cranial nerves grossly intact.   Strength 5/5 and symmetric plantar/dorsiflexion     Psychiatric: Alert, orientation to person, place, and time. Cooperative.    Assessment/Plan   Anthony Faye is a 49 y.o. male with a past medical history of anxiety, depression, low back, pelvic, and hip pain. He has been taking hydrocodone 5-325 twice a day and this has not been helping to control his pain.     Plan:  -refill Norco 5-325mg at 75 tablets  -send referral for PT to work on core strengthening    Follow up: 3 months    The patient was invited to contact us back anytime with any questions or concerns and follow-up with us in the office as needed.     There are no diagnoses linked to this encounter.    This note was generated with the aid of dictation software, there may be typos despite my attempts at proofreading.

## 2024-12-18 DIAGNOSIS — M54.16 LUMBAR RADICULOPATHY: ICD-10-CM

## 2024-12-18 RX ORDER — HYDROCODONE BITARTRATE AND ACETAMINOPHEN 5; 325 MG/1; MG/1
1 TABLET ORAL 3 TIMES DAILY PRN
Qty: 75 TABLET | Refills: 0 | Status: SHIPPED | OUTPATIENT
Start: 2024-12-23 | End: 2025-01-22

## 2024-12-23 DIAGNOSIS — M54.16 LUMBAR RADICULOPATHY: ICD-10-CM

## 2024-12-23 RX ORDER — HYDROCODONE BITARTRATE AND ACETAMINOPHEN 5; 325 MG/1; MG/1
1 TABLET ORAL 3 TIMES DAILY PRN
Qty: 75 TABLET | Refills: 0 | Status: SHIPPED | OUTPATIENT
Start: 2024-12-23 | End: 2025-01-22

## 2025-01-24 DIAGNOSIS — M54.16 LUMBAR RADICULOPATHY: ICD-10-CM

## 2025-01-24 RX ORDER — HYDROCODONE BITARTRATE AND ACETAMINOPHEN 5; 325 MG/1; MG/1
1 TABLET ORAL 3 TIMES DAILY PRN
Qty: 75 TABLET | Refills: 0 | Status: SHIPPED | OUTPATIENT
Start: 2025-01-24 | End: 2025-02-23

## 2025-02-21 DIAGNOSIS — M54.16 LUMBAR RADICULOPATHY: ICD-10-CM

## 2025-02-21 RX ORDER — HYDROCODONE BITARTRATE AND ACETAMINOPHEN 5; 325 MG/1; MG/1
1 TABLET ORAL 3 TIMES DAILY PRN
Qty: 75 TABLET | Refills: 0 | Status: SHIPPED | OUTPATIENT
Start: 2025-02-21 | End: 2025-03-23

## 2025-02-21 RX ORDER — MELOXICAM 7.5 MG/1
7.5 TABLET ORAL DAILY
Qty: 30 TABLET | Refills: 3 | Status: SHIPPED | OUTPATIENT
Start: 2025-02-21 | End: 2025-06-21

## 2025-02-24 RX ORDER — HYDROCODONE BITARTRATE AND ACETAMINOPHEN 5; 325 MG/1; MG/1
1 TABLET ORAL 3 TIMES DAILY PRN
Qty: 42 TABLET | Refills: 0 | Status: SHIPPED | OUTPATIENT
Start: 2025-02-24 | End: 2025-03-10

## 2025-03-03 ENCOUNTER — OFFICE VISIT (OUTPATIENT)
Dept: PAIN MEDICINE | Facility: HOSPITAL | Age: 50
End: 2025-03-03
Payer: COMMERCIAL

## 2025-03-03 DIAGNOSIS — Z98.890 S/P BILATERAL INGUINAL HERNIORRHAPHY: Primary | ICD-10-CM

## 2025-03-03 DIAGNOSIS — Z79.899 ENCOUNTER FOR LONG-TERM (CURRENT) DRUG USE: ICD-10-CM

## 2025-03-03 DIAGNOSIS — M54.16 LUMBAR RADICULOPATHY: ICD-10-CM

## 2025-03-03 DIAGNOSIS — Z87.19 S/P BILATERAL INGUINAL HERNIORRHAPHY: Primary | ICD-10-CM

## 2025-03-03 PROCEDURE — 99213 OFFICE O/P EST LOW 20 MIN: CPT | Performed by: PAIN MEDICINE

## 2025-03-03 RX ORDER — HYDROCODONE BITARTRATE AND ACETAMINOPHEN 5; 325 MG/1; MG/1
1 TABLET ORAL 2 TIMES DAILY PRN
Qty: 60 TABLET | Refills: 0 | Status: SHIPPED | OUTPATIENT
Start: 2025-03-03 | End: 2025-04-02

## 2025-03-03 ASSESSMENT — PAIN SCALES - GENERAL: PAINLEVEL_OUTOF10: 7

## 2025-03-03 NOTE — PROGRESS NOTES
Subjective   Patient ID: Anthony Faye is a 49 y.o. male with a past medical history of low back pain, pelvic pain, anxiety, depression       HPI:   49-year-old male presenting as a known patient to pain management clinic for 90-day follow-up of taking hydrocodone 5-325. He also currently takes benzodiazepines  Patient's pain is primarily located across the pelvis secondary to multiple hernia surgeries and mesh placement.  Patient also has long-term low back pain with occasional radicular symptoms down the back of his leg and associated numbness and tingling in the L5-S1 distribution. He has had no recent falls or bowel/bladder incontinence.    Physical Therapy: The patient has not done physical therapy within the past six months  Other Conservative Measures he has tried: Injections  Classes of medications tried in the past: Opioids    I have personally reviewed the OARRS report for Anthony Faye I have considered the risks of abuse, dependence, addiction and diversion    OARRS:  No data recorded  I have personally reviewed the OARRS report for Anthony Faye. I have considered the risks of abuse, dependence, addiction and diversion    Is the patient prescribed a combination of a benzodiazepine and opioid?  Yes, I feel it is clincially indicated to continue the medication and have discussed with the patient risks/benefits/alternatives.  Controlled Substance Agreement:  Reviewed Controlled Substance Agreement including but not limited to the benefits, risks, and alternatives to treatment with a Controlled Substance medication(s).     Last Urine Drug Screen:  Recent Results (from the past 8760 hours)   Opiate Confirmation, Urine    Collection Time: 06/28/24 12:25 PM   Result Value Ref Range    6-Acetylmorphine <25 <25 ng/mL    Codeine <50 <50 ng/mL    Hydrocodone 353 (H) <25 ng/mL    Hydromorphone 113 (H) <25 ng/mL    Morphine  <50 <50 ng/mL    Norhydrocodone 634 (H) <25 ng/mL    Noroxycodone <25 <25 ng/mL    Oxycodone  <25 <25 ng/mL    Oxymorphone <25 <25 ng/mL   Benzodiazepine Confirmation, Urine    Collection Time: 06/28/24 12:25 PM   Result Value Ref Range    Clonazepam <25 <25 ng/mL    7-Aminoclonazepam <25 <25 ng/mL    Alprazolam 353 (H) <25 ng/mL    Alpha-Hydroxyalprazolam 495 (H) <25 ng/mL    Midazolam <25 <25 ng/mL    Alpha-Hydroxymidazolam <25 <25 ng/mL    Chlordiazepoxide <25 <25 ng/mL    Diazepam <25 <25 ng/mL    Nordiazepam <25 <25 ng/mL    Temazepam <25 <25 ng/mL    Oxazepam <25 <25 ng/mL    Lorazepam <25 <25 ng/mL   Drug Screen, Urine With Reflex to Confirmation    Collection Time: 06/28/24 12:25 PM   Result Value Ref Range    Amphetamine Screen, Urine Presumptive Negative Presumptive Negative    Barbiturate Screen, Urine Presumptive Negative Presumptive Negative    Benzodiazepines Screen, Urine Presumptive Positive (A) Presumptive Negative    Cannabinoid Screen, Urine Presumptive Positive (A) Presumptive Negative    Cocaine Metabolite Screen, Urine Presumptive Negative Presumptive Negative    Fentanyl Screen, Urine Presumptive Negative Presumptive Negative    Opiate Screen, Urine Presumptive Positive (A) Presumptive Negative    Oxycodone Screen, Urine Presumptive Negative Presumptive Negative    PCP Screen, Urine Presumptive Negative Presumptive Negative    Methadone Screen, Urine Presumptive Negative Presumptive Negative     Results unexpected. Positive benzodiazepines and THC      Review of Systems   13-point ROS done and negative except for HPI.     Current Outpatient Medications   Medication Instructions    ALPRAZolam (XANAX) 1 mg, oral, 3 times daily PRN    dicyclomine (BENTYL) 10 mg, oral, 3 times daily PRN    diphenoxylate-atropine (Lomotil) 2.5-0.025 mg/5 mL liquid 5 mL, oral, 3 times daily PRN    estazolam (PROSOM) 2 mg, oral, Nightly    HYDROcodone-acetaminophen (Norco) 5-325 mg tablet 1 tablet, oral, 3 times daily PRN    meloxicam (MOBIC) 7.5 mg, oral, Daily    methocarbamol (ROBAXIN) 750 mg, oral,  Nightly    naloxone (NARCAN) 4 mg, nasal, As needed, May repeat every 2-3 minutes if needed, alternating nostrils, until medical assistance becomes available.       No past medical history on file.     No past surgical history on file.     No family history on file.     Allergies   Allergen Reactions    Augmentin [Amoxicillin-Pot Clavulanate] GI Upset    Furazolidone Unknown    Nortriptyline Unknown     Dilated pupil, insomnia, red skin blotches       Penicillins Diarrhea    Sertraline Unknown        Objective     There were no vitals filed for this visit.     Physical Exam  General: NAD, well groomed, well nourished  Eyes: Non-icteric sclera, EOMI  Ears, Nose, Mouth, and Throat: External ears and nose appear to be without deformity or rash. No lesions or masses noted. Hearing is grossly intact.   Neck: Trachea midline  Respiratory: Nonlabored breathing   Cardiovascular: no peripheral edema   Skin: No rashes or open lesions/ulcers identified on skin.    Back:   Palpation: No tenderness to palpation over lumbar paraspinous muscles.   Straight leg raise: does not reproduce their pain, bilaterally   BHARAT Maneuver does not reproduce pain bilaterally      Neurologic:   Cranial nerves grossly intact.   Strength: 5/5 and symmetric plantar/dorsiflexion   Sensation: Normal to light touch throughout, pinprick intact throughout.  DTRs:normal and symmetric throughout  Shi: absent  Clonus: absent    Psychiatric: Alert, orientation to person, place, and time. Cooperative.    Imaging personally reviewed and independently interpreted    Assessment/Plan   48 yo M with a past medical history of low back pain, pelvic pain, anxiety, depression presenting for 90 day follow up for use of Norco for low back and pelvic pain. Patient currently also benzodiazepines and was advised he cannot take these medications while also on opioids. Pain is well controlled but patient was advised that we must down titrate opioids.     Plan:  -Norco  2x a day for 1 month  -Continue down titrating medications    Follow up: As needed     The patient was invited to contact us back anytime with any questions or concerns and follow-up with us in the office as needed.     Diagnoses and all orders for this visit:  S/P bilateral inguinal herniorrhaphy  Lumbar radiculopathy  Encounter for long-term (current) drug use      This note was generated with the aid of dictation software, there may be typos despite my attempts at proofreading.

## 2025-04-07 ENCOUNTER — OFFICE VISIT (OUTPATIENT)
Dept: PAIN MEDICINE | Facility: HOSPITAL | Age: 50
End: 2025-04-07
Payer: COMMERCIAL

## 2025-04-07 DIAGNOSIS — M54.16 LUMBAR RADICULOPATHY: ICD-10-CM

## 2025-04-07 DIAGNOSIS — Z98.890 S/P BILATERAL INGUINAL HERNIORRHAPHY: ICD-10-CM

## 2025-04-07 DIAGNOSIS — Z79.899 ENCOUNTER FOR LONG-TERM (CURRENT) DRUG USE: ICD-10-CM

## 2025-04-07 DIAGNOSIS — Z87.19 S/P BILATERAL INGUINAL HERNIORRHAPHY: ICD-10-CM

## 2025-04-07 DIAGNOSIS — Z79.899 ENCOUNTER FOR LONG-TERM (CURRENT) DRUG USE: Primary | ICD-10-CM

## 2025-04-07 DIAGNOSIS — R10.2 PELVIC PAIN: ICD-10-CM

## 2025-04-07 PROCEDURE — 99214 OFFICE O/P EST MOD 30 MIN: CPT | Performed by: PAIN MEDICINE

## 2025-04-07 RX ORDER — HYDROCODONE BITARTRATE AND ACETAMINOPHEN 5; 325 MG/1; MG/1
1 TABLET ORAL 2 TIMES DAILY PRN
Qty: 45 TABLET | Refills: 0 | Status: SHIPPED | OUTPATIENT
Start: 2025-04-07 | End: 2025-05-07

## 2025-04-07 RX ORDER — HYDROCODONE BITARTRATE AND ACETAMINOPHEN 5; 325 MG/1; MG/1
1 TABLET ORAL 2 TIMES DAILY PRN
Qty: 45 TABLET | Refills: 0 | Status: SHIPPED | OUTPATIENT
Start: 2025-04-07 | End: 2025-04-07 | Stop reason: SDUPTHER

## 2025-04-07 NOTE — PROGRESS NOTES
Subjective   Patient ID: Anthony Faye is a 49 y.o. male with a past medical history of  history of low back pain, pelvic pain, anxiety, depression who presents for follow up.       HPI:   49-year-old male presenting as a known patient to pain management clinic for 90-day follow-up of taking hydrocodone 5-325. He also currently takes benzodiazepines  Patient's pain is primarily located across the pelvis secondary to multiple hernia surgeries and mesh placement.  Patient also has long-term low back pain with occasional radicular symptoms down the back of his leg and associated numbness and tingling in the L5-S1 distribution. He has had no recent falls or bowel/bladder incontinence. He reports that he has been working for a long time to cut opiate use and understands that we cannot keep prescribing these medications due to the  policy. He is here today to give us forms to transfer his care.     Physical Therapy: The patient has not done physical therapy within the past six months  Other Conservative Measures he has tried: Injections  Classes of medications tried in the past: Opioids, NSAIDS, tylenol     I have personally reviewed the OARRS report for Anthony Faye I have considered the risks of abuse, dependence, addiction and diversion     OARRS:  No data recorded  I have personally reviewed the OARRS report for Anthony Faye. I have considered the risks of abuse, dependence, addiction and diversion     Is the patient prescribed a combination of a benzodiazepine and opioid?  Yes, I feel it is clincially indicated to continue the medication and have discussed with the patient risks/benefits/alternatives.  Controlled Substance Agreement:  Reviewed Controlled Substance Agreement including but not limited to the benefits, risks, and alternatives to treatment with a Controlled Substance medication(s).     Last Urine Drug Screen:  Recent Results (from the past 8760 hours)   Opiate Confirmation, Urine    Collection Time:  06/28/24 12:25 PM   Result Value Ref Range    6-Acetylmorphine <25 <25 ng/mL    Codeine <50 <50 ng/mL    Hydrocodone 353 (H) <25 ng/mL    Hydromorphone 113 (H) <25 ng/mL    Morphine  <50 <50 ng/mL    Norhydrocodone 634 (H) <25 ng/mL    Noroxycodone <25 <25 ng/mL    Oxycodone <25 <25 ng/mL    Oxymorphone <25 <25 ng/mL   Benzodiazepine Confirmation, Urine    Collection Time: 06/28/24 12:25 PM   Result Value Ref Range    Clonazepam <25 <25 ng/mL    7-Aminoclonazepam <25 <25 ng/mL    Alprazolam 353 (H) <25 ng/mL    Alpha-Hydroxyalprazolam 495 (H) <25 ng/mL    Midazolam <25 <25 ng/mL    Alpha-Hydroxymidazolam <25 <25 ng/mL    Chlordiazepoxide <25 <25 ng/mL    Diazepam <25 <25 ng/mL    Nordiazepam <25 <25 ng/mL    Temazepam <25 <25 ng/mL    Oxazepam <25 <25 ng/mL    Lorazepam <25 <25 ng/mL   Drug Screen, Urine With Reflex to Confirmation    Collection Time: 06/28/24 12:25 PM   Result Value Ref Range    Amphetamine Screen, Urine Presumptive Negative Presumptive Negative    Barbiturate Screen, Urine Presumptive Negative Presumptive Negative    Benzodiazepines Screen, Urine Presumptive Positive (A) Presumptive Negative    Cannabinoid Screen, Urine Presumptive Positive (A) Presumptive Negative    Cocaine Metabolite Screen, Urine Presumptive Negative Presumptive Negative    Fentanyl Screen, Urine Presumptive Negative Presumptive Negative    Opiate Screen, Urine Presumptive Positive (A) Presumptive Negative    Oxycodone Screen, Urine Presumptive Negative Presumptive Negative    PCP Screen, Urine Presumptive Negative Presumptive Negative    Methadone Screen, Urine Presumptive Negative Presumptive Negative       Review of Systems   13-point ROS done and negative except for HPI.     Current Outpatient Medications   Medication Instructions    ALPRAZolam (XANAX) 1 mg, oral, 3 times daily PRN    dicyclomine (BENTYL) 10 mg, oral, 3 times daily PRN    diphenoxylate-atropine (Lomotil) 2.5-0.025 mg/5 mL liquid 5 mL, oral, 3 times daily  PRN    estazolam (PROSOM) 2 mg, oral, Nightly    meloxicam (MOBIC) 7.5 mg, oral, Daily    methocarbamol (ROBAXIN) 750 mg, oral, Nightly    naloxone (NARCAN) 4 mg, nasal, As needed, May repeat every 2-3 minutes if needed, alternating nostrils, until medical assistance becomes available.       No past medical history on file.     No past surgical history on file.     No family history on file.     Allergies   Allergen Reactions    Augmentin [Amoxicillin-Pot Clavulanate] GI Upset    Furazolidone Unknown    Nortriptyline Unknown     Dilated pupil, insomnia, red skin blotches       Penicillins Diarrhea    Sertraline Unknown        Objective     There were no vitals filed for this visit.     Physical Exam  General: NAD, well groomed, well nourished  Eyes: Non-icteric sclera, EOMI  Ears, Nose, Mouth, and Throat: External ears and nose appear to be without deformity or rash. No lesions or masses noted. Hearing is grossly intact.   Neck: Trachea midline  Respiratory: Nonlabored breathing   Cardiovascular: no peripheral edema   Skin: No rashes or open lesions/ulcers identified on skin.     Back:   Palpation: No tenderness to palpation over lumbar paraspinous muscles.   Straight leg raise: does not reproduce their pain, bilaterally   BHARAT Maneuver does not reproduce pain bilaterally        Neurologic:   Cranial nerves grossly intact.   Strength: 5/5 and symmetric plantar/dorsiflexion   Sensation: Normal to light touch throughout, pinprick intact throughout.  DTRs:normal and symmetric throughout  Shi: absent  Clonus: absent     Psychiatric: Alert, orientation to person, place, and time. Cooperative.    Assessment/Plan   48 yo M with a past medical history of low back pain, pelvic pain, anxiety, depression presenting for follow up for use of Norco for low back and pelvic pain. Patient currently also benzodiazepines and was advised he cannot take these medications while also on opioids. Pain is well controlled but patient  was advised that we must down titrate opioids as we cannot prescribe opioids with benzos and also UDS +for THC. Patient asking to transfer his care, and we will send our records over.    Plan:  - will refill his norco 5-325mg BID PRN for 1 month, discussed that we will no longer be filling after this as he is on benzos  -Discussed importance of having Narcan in his house and educating his family members on the use of Narcan  -Discussed continuing to wean and down titrate Norco and only taking it when his pain is severe    The patient was invited to contact us back anytime with any questions or concerns and follow-up with us in the office as needed.     Diagnoses and all orders for this visit:  Encounter for long-term (current) drug use  Lumbar radiculopathy  S/P bilateral inguinal herniorrhaphy  Pelvic pain      This note was generated with the aid of dictation software, there may be typos despite my attempts at proofreading.   The patient was discussed and seen with Dr. Post.  José Varela MD  [id number]  Interventional Pain Fellow

## 2025-05-05 DIAGNOSIS — M54.16 LUMBAR RADICULOPATHY: ICD-10-CM

## 2025-05-05 DIAGNOSIS — Z79.899 ENCOUNTER FOR LONG-TERM (CURRENT) DRUG USE: ICD-10-CM

## 2025-05-05 DIAGNOSIS — Z87.19 S/P BILATERAL INGUINAL HERNIORRHAPHY: ICD-10-CM

## 2025-05-05 DIAGNOSIS — Z98.890 S/P BILATERAL INGUINAL HERNIORRHAPHY: ICD-10-CM

## 2025-05-05 RX ORDER — HYDROCODONE BITARTRATE AND ACETAMINOPHEN 5; 325 MG/1; MG/1
1 TABLET ORAL DAILY PRN
Qty: 30 TABLET | Refills: 0 | Status: SHIPPED | OUTPATIENT
Start: 2025-05-05 | End: 2025-06-04

## 2025-07-06 DIAGNOSIS — M54.16 LUMBAR RADICULOPATHY: ICD-10-CM

## 2025-07-07 RX ORDER — MELOXICAM 7.5 MG/1
7.5 TABLET ORAL DAILY
Qty: 30 TABLET | Refills: 0 | Status: SHIPPED | OUTPATIENT
Start: 2025-07-07